# Patient Record
Sex: FEMALE | Race: WHITE | Employment: OTHER | URBAN - METROPOLITAN AREA
[De-identification: names, ages, dates, MRNs, and addresses within clinical notes are randomized per-mention and may not be internally consistent; named-entity substitution may affect disease eponyms.]

---

## 2021-09-16 PROBLEM — R13.10 DYSPHAGIA: Status: ACTIVE | Noted: 2021-09-16

## 2021-09-29 PROBLEM — E66.01 SEVERE OBESITY (HCC): Status: ACTIVE | Noted: 2021-09-29

## 2021-10-12 PROBLEM — Z98.890 POST-OPERATIVE STATE: Status: ACTIVE | Noted: 2021-10-12

## 2021-10-12 PROBLEM — K22.2 ESOPHAGEAL STRICTURE: Status: ACTIVE | Noted: 2021-10-12

## 2022-03-18 PROBLEM — Z98.890 POST-OPERATIVE STATE: Status: ACTIVE | Noted: 2021-10-12

## 2022-03-18 PROBLEM — R13.10 DYSPHAGIA: Status: ACTIVE | Noted: 2021-09-16

## 2022-03-20 PROBLEM — K22.2 ESOPHAGEAL STRICTURE: Status: ACTIVE | Noted: 2021-10-12

## 2022-03-20 PROBLEM — E66.01 SEVERE OBESITY (HCC): Status: ACTIVE | Noted: 2021-09-29

## 2022-07-08 RX ORDER — NORGESTIMATE AND ETHINYL ESTRADIOL 0.25-0.035
1 KIT ORAL DAILY
COMMUNITY
Start: 2022-04-26

## 2022-07-08 RX ORDER — TIZANIDINE 2 MG/1
2 TABLET ORAL 2 TIMES DAILY
COMMUNITY
Start: 2022-04-18

## 2022-07-08 RX ORDER — MULTIVITAMIN
1 CAPSULE ORAL DAILY
COMMUNITY

## 2022-07-08 RX ORDER — TRAMADOL HYDROCHLORIDE 50 MG/1
50 TABLET ORAL
COMMUNITY
Start: 2022-05-24

## 2022-07-08 NOTE — PERIOP NOTES
PRE-SURGICAL INSTRUCTIONS        Patient's Name:  Andre Pool      ICQMP'K Date:  7/8/2022            Covid Testing Date and Time:    Surgery Date:  7/12/2022                1. Do NOT eat or drink anything, including candy, gum, or ice chips after midnight on 07/11, unless you have specific instructions from your surgeon or anesthesia provider to do so.  2. You may brush your teeth before coming to the hospital.  3. No smoking 24 hours prior to the day of surgery. 4. No alcohol 24 hours prior to the day of surgery. 5. No recreational drugs for one week prior to the day of surgery. 6. Leave all valuables, including money/purse, at home. 7. Remove all jewelry, nail polish, acrylic nails, and makeup (including mascara); no lotions powders, deodorant, or perfume/cologne/after shave on the skin. 8. Follow instruction for Hibiclens washes and CHG wipes from surgeon's office. 9. Glasses/contact lenses and dentures may be worn to the hospital.  They will be removed prior to surgery. 10. Call your doctor if symptoms of a cold or illness develop within 24-48 hours prior to your surgery. 11.  If you are having an outpatient procedure, please make arrangements for a responsible ADULT TO [address] [place] [address] and stay with you for 24 hours after your surgery. 12. ONE VISITOR in the hospital at this time for outpatient procedures. Exceptions may be made for surgical admissions, per nursing unit guidelines      Special Instructions:      Bring COVID card. Bring list of CURRENT medications. Bring any pertinent legal medical records. Take these medications the morning of surgery with a sip of water:  NONE  Follow physician instructions about stopping anticoagulants. Complete bowel prep per MD instructions. On the day of surgery, come in the main entrance of [facility]. Let the  at the desk know you are there for surgery.   A staff member will come escort you to the surgical area on the second floor. If you have any questions or concerns, please do not hesitate to call:     (Prior to the day of surgery) PAT department:  [phone]   (Day of surgery) Pre-Op department:  [phone]    These surgical instructions were reviewed with Kayla Spear during the [facility] phone call.

## 2022-07-11 ENCOUNTER — ANESTHESIA EVENT (OUTPATIENT)
Dept: ENDOSCOPY | Age: 32
End: 2022-07-11
Payer: OTHER GOVERNMENT

## 2022-07-12 ENCOUNTER — ANESTHESIA (OUTPATIENT)
Dept: ENDOSCOPY | Age: 32
End: 2022-07-12
Payer: OTHER GOVERNMENT

## 2022-07-12 ENCOUNTER — HOSPITAL ENCOUNTER (OUTPATIENT)
Age: 32
Setting detail: OUTPATIENT SURGERY
Discharge: HOME | End: 2022-07-12
Attending: INTERNAL MEDICINE | Admitting: INTERNAL MEDICINE
Payer: OTHER GOVERNMENT

## 2022-07-12 VITALS
BODY MASS INDEX: 37.98 KG/M2 | HEIGHT: 67 IN | OXYGEN SATURATION: 100 % | RESPIRATION RATE: 16 BRPM | HEART RATE: 75 BPM | DIASTOLIC BLOOD PRESSURE: 75 MMHG | TEMPERATURE: 97.3 F | WEIGHT: 242 LBS | SYSTOLIC BLOOD PRESSURE: 112 MMHG

## 2022-07-12 PROCEDURE — 77030019988 HC FCPS ENDOSC DISP BSC -B: Performed by: INTERNAL MEDICINE

## 2022-07-12 PROCEDURE — 76060000032 HC ANESTHESIA 0.5 TO 1 HR: Performed by: INTERNAL MEDICINE

## 2022-07-12 PROCEDURE — 77030008565 HC TBNG SUC IRR ERBE -B: Performed by: INTERNAL MEDICINE

## 2022-07-12 PROCEDURE — 00813 ANES UPR LWR GI NDSC PX: CPT | Performed by: ANESTHESIOLOGY

## 2022-07-12 PROCEDURE — 74011000250 HC RX REV CODE- 250: Performed by: NURSE ANESTHETIST, CERTIFIED REGISTERED

## 2022-07-12 PROCEDURE — 2709999900 HC NON-CHARGEABLE SUPPLY: Performed by: INTERNAL MEDICINE

## 2022-07-12 PROCEDURE — 76040000007: Performed by: INTERNAL MEDICINE

## 2022-07-12 PROCEDURE — 88305 TISSUE EXAM BY PATHOLOGIST: CPT

## 2022-07-12 PROCEDURE — 00813 ANES UPR LWR GI NDSC PX: CPT | Performed by: NURSE ANESTHETIST, CERTIFIED REGISTERED

## 2022-07-12 PROCEDURE — 74011250636 HC RX REV CODE- 250/636: Performed by: NURSE ANESTHETIST, CERTIFIED REGISTERED

## 2022-07-12 RX ORDER — ATROPINE SULFATE 0.1 MG/ML
0.5 INJECTION INTRAVENOUS
Status: CANCELLED | OUTPATIENT
Start: 2022-07-12 | End: 2022-07-13

## 2022-07-12 RX ORDER — NALOXONE HYDROCHLORIDE 0.4 MG/ML
0.4 INJECTION, SOLUTION INTRAMUSCULAR; INTRAVENOUS; SUBCUTANEOUS
Status: CANCELLED | OUTPATIENT
Start: 2022-07-12 | End: 2022-07-12

## 2022-07-12 RX ORDER — EPINEPHRINE 0.1 MG/ML
1 INJECTION INTRACARDIAC; INTRAVENOUS
Status: CANCELLED | OUTPATIENT
Start: 2022-07-12 | End: 2022-07-13

## 2022-07-12 RX ORDER — SODIUM CHLORIDE 0.9 % (FLUSH) 0.9 %
5-40 SYRINGE (ML) INJECTION AS NEEDED
Status: CANCELLED | OUTPATIENT
Start: 2022-07-12

## 2022-07-12 RX ORDER — SODIUM CHLORIDE 0.9 % (FLUSH) 0.9 %
5-40 SYRINGE (ML) INJECTION AS NEEDED
Status: DISCONTINUED | OUTPATIENT
Start: 2022-07-12 | End: 2022-07-12 | Stop reason: HOSPADM

## 2022-07-12 RX ORDER — MAGNESIUM SULFATE 100 %
4 CRYSTALS MISCELLANEOUS AS NEEDED
Status: DISCONTINUED | OUTPATIENT
Start: 2022-07-12 | End: 2022-07-12 | Stop reason: HOSPADM

## 2022-07-12 RX ORDER — PSEUDOEPHEDRINE HCL 30 MG
500 TABLET ORAL DAILY
COMMUNITY

## 2022-07-12 RX ORDER — PROPOFOL 10 MG/ML
INJECTION, EMULSION INTRAVENOUS AS NEEDED
Status: DISCONTINUED | OUTPATIENT
Start: 2022-07-12 | End: 2022-07-12 | Stop reason: HOSPADM

## 2022-07-12 RX ORDER — SODIUM CHLORIDE 0.9 % (FLUSH) 0.9 %
5-40 SYRINGE (ML) INJECTION EVERY 8 HOURS
Status: CANCELLED | OUTPATIENT
Start: 2022-07-12

## 2022-07-12 RX ORDER — LIDOCAINE HYDROCHLORIDE 20 MG/ML
INJECTION, SOLUTION EPIDURAL; INFILTRATION; INTRACAUDAL; PERINEURAL AS NEEDED
Status: DISCONTINUED | OUTPATIENT
Start: 2022-07-12 | End: 2022-07-12 | Stop reason: HOSPADM

## 2022-07-12 RX ORDER — INSULIN LISPRO 100 [IU]/ML
INJECTION, SOLUTION INTRAVENOUS; SUBCUTANEOUS ONCE
Status: DISCONTINUED | OUTPATIENT
Start: 2022-07-12 | End: 2022-07-12 | Stop reason: HOSPADM

## 2022-07-12 RX ORDER — DEXTROMETHORPHAN/PSEUDOEPHED 2.5-7.5/.8
1.2 DROPS ORAL
Status: CANCELLED | OUTPATIENT
Start: 2022-07-12

## 2022-07-12 RX ORDER — SODIUM CHLORIDE, SODIUM LACTATE, POTASSIUM CHLORIDE, CALCIUM CHLORIDE 600; 310; 30; 20 MG/100ML; MG/100ML; MG/100ML; MG/100ML
75 INJECTION, SOLUTION INTRAVENOUS CONTINUOUS
Status: DISCONTINUED | OUTPATIENT
Start: 2022-07-12 | End: 2022-07-12 | Stop reason: HOSPADM

## 2022-07-12 RX ORDER — FLUMAZENIL 0.1 MG/ML
0.2 INJECTION INTRAVENOUS
Status: CANCELLED | OUTPATIENT
Start: 2022-07-12 | End: 2022-07-12

## 2022-07-12 RX ORDER — SODIUM CHLORIDE 0.9 % (FLUSH) 0.9 %
5-40 SYRINGE (ML) INJECTION EVERY 8 HOURS
Status: DISCONTINUED | OUTPATIENT
Start: 2022-07-12 | End: 2022-07-12 | Stop reason: HOSPADM

## 2022-07-12 RX ADMIN — PROPOFOL 40 MG: 10 INJECTION, EMULSION INTRAVENOUS at 07:49

## 2022-07-12 RX ADMIN — PROPOFOL 100 MG: 10 INJECTION, EMULSION INTRAVENOUS at 07:32

## 2022-07-12 RX ADMIN — PROPOFOL 40 MG: 10 INJECTION, EMULSION INTRAVENOUS at 07:43

## 2022-07-12 RX ADMIN — SODIUM CHLORIDE, PRESERVATIVE FREE 20 MG: 5 INJECTION INTRAVENOUS at 06:57

## 2022-07-12 RX ADMIN — PROPOFOL 50 MG: 10 INJECTION, EMULSION INTRAVENOUS at 07:40

## 2022-07-12 RX ADMIN — PROPOFOL 40 MG: 10 INJECTION, EMULSION INTRAVENOUS at 07:46

## 2022-07-12 RX ADMIN — PROPOFOL 100 MG: 10 INJECTION, EMULSION INTRAVENOUS at 07:34

## 2022-07-12 RX ADMIN — PROPOFOL 25 MG: 10 INJECTION, EMULSION INTRAVENOUS at 07:52

## 2022-07-12 RX ADMIN — SODIUM CHLORIDE, POTASSIUM CHLORIDE, SODIUM LACTATE AND CALCIUM CHLORIDE 75 ML/HR: 600; 310; 30; 20 INJECTION, SOLUTION INTRAVENOUS at 06:57

## 2022-07-12 RX ADMIN — LIDOCAINE HYDROCHLORIDE 80 MG: 20 INJECTION, SOLUTION EPIDURAL; INFILTRATION; INTRACAUDAL; PERINEURAL at 07:32

## 2022-07-12 RX ADMIN — PROPOFOL 50 MG: 10 INJECTION, EMULSION INTRAVENOUS at 07:37

## 2022-07-12 NOTE — PROGRESS NOTES
conducted a pre-surgery visit with Khloe Chung, who is a 27 y.o.,female. The  provided the following Interventions:  Initiated a relationship of care and support. Offered prayer and assurance of continued prayers on patient's behalf. There is no advance directive present. Plan:  Chaplains will continue to follow and will provide pastoral care on an as needed/requested basis.  recommends bedside caregivers page  on duty if patient shows signs of acute spiritual or emotional distress.   Reiseñor 3   Board Certified [address] [place] [address]   [phone]

## 2022-07-12 NOTE — DISCHARGE INSTRUCTIONS
Patient Education        Upper GI Endoscopy: What to Expect at [address] had an upper GI endoscopy. Your doctor used a thin, lighted tube that bends to look at the inside of your esophagus, your stomach, and the first part of the small intestine, called the duodenum. After you have an endoscopy, you will stay at the hospital or clinic for 1 to 2 hours. This will allow the medicine to wear off. You will be able to go home after your doctor or nurse checks to make sure that you're not having any problems. You may have to stay overnight if you had treatment during the test. You may have a sore throat for a day or two after the test.  This care sheet gives you a general idea about what to expect after the test.  How can you care for yourself at home? Activity   · Rest as much as you need to after you go home. · You should be able to go back to your usual activities the day after the test.  Diet   · Follow your doctor's directions for eating after the test.  · Drink plenty of fluids (unless your doctor has told you not to). Medications   · If you have a sore throat the day after the test, use an over-the-counter spray to numb your throat. Follow-up care is a key part of your treatment and safety. Be sure to make and go to all appointments, and call your doctor if you are having problems. It's also a good idea to know your test results and keep a list of the medicines you take. When should you call for help? Call 911 anytime you think you may need emergency care. For example, call if:    · You passed out (lost consciousness).     · You have trouble breathing.     · You pass maroon or bloody stools.    Call your doctor now or seek immediate medical care if:    · You have pain that does not get better after your take pain medicine.     · You have new or worse belly pain.     · You have blood in your stools.     · You are sick to your stomach and cannot keep fluids down.     · You have a fever.     · You cannot pass stools or gas. Watch closely for changes in your health, and be sure to contact your doctor if:    · Your throat still hurts after a day or two.     · You do not get better as expected. Where can you learn more? Go to http://www.Lift.com/  Enter J454 in the search box to learn more about \"Upper GI Endoscopy: What to Expect at Home. \"  Current as of: September 8, 2021               Content Version: 13.2  © 2006-2022 [employer]. Care instructions adapted under license by [employer] (which disclaims liability or warranty for this information). If you have questions about a medical condition or this instruction, always ask your healthcare professional. [address] any warranty or liability for your use of this information. Colonoscopy: What to Expect at [address] [place] [address]  After a colonoscopy, you'll stay at the clinic until you wake up. Then you can go home. But you'll need to arrange for a ride. Your doctor will tell you when you can eat and do your other usual activities. Your doctor will talk to you about when you'll need your next colonoscopy. Your doctor can help you decide how often you need to be checked. This will depend on the results of your test and your risk for colorectal cancer. After the test, you may be bloated or have gas pains. You may need to pass gas. If a biopsy was done or a polyp was removed, you may have streaks of blood in your stool (feces) for a few days. Problems such as heavy rectal bleeding may not occur until several weeks after the test. This isn't common. But it can happen after polyps are removed. This care sheet gives you a general idea about how long it will take for you to recover. But each person recovers at a different pace. Follow the steps below to get better as quickly as possible. How can you care for yourself at home? Activity    · Rest when you feel tired.      · You can do your normal activities when it feels okay to do so. Diet    · Follow your doctor's directions for eating. · Unless your doctor has told you not to, drink plenty of fluids. This helps to replace the fluids that were lost during the colon prep. · Do not drink alcohol. Medicines    · Your doctor will tell you if and when you can restart your medicines. You will also be given instructions about taking any new medicines. · If you take aspirin or some other blood thinner, ask your doctor if and when to start taking it again. Make sure that you understand exactly what your doctor wants you to do. · If polyps were removed or a biopsy was done during the test, your doctor may tell you not to take aspirin or other anti-inflammatory medicines for a few days. These include ibuprofen (Advil, Motrin) and naproxen (Aleve). Other instructions    · For your safety, do not drive or operate machinery until the medicine wears off and you can think clearly. Your doctor may tell you not to drive or operate machinery until the day after your test.     · Do not sign legal documents or make major decisions until the medicine wears off and you can think clearly. The anesthesia can make it hard for you to fully understand what you are agreeing to. Follow-up care is a key part of your treatment and safety. Be sure to make and go to all appointments, and call your doctor if you are having problems. It's also a good idea to know your test results and keep a list of the medicines you take. When should you call for help? Call 911 anytime you think you may need emergency care. For example, call if:    · You passed out (lost consciousness). · You pass maroon or bloody stools. · You have trouble breathing. Call your doctor now or seek immediate medical care if:    · You have pain that does not get better after you take pain medicine. · You are sick to your stomach or cannot drink fluids.      · You have new or worse belly pain. · You have blood in your stools. · You have a fever. · You cannot pass stools or gas. Watch closely for changes in your health, and be sure to contact your doctor if you have any problems. Where can you learn more? Go to http://www.gray.com/  Enter E264 in the search box to learn more about \"Colonoscopy: What to Expect at Home. \"  Current as of: September 8, 2021               Content Version: 13.2  © 2006-2022 "[employer]. Care instructions adapted under license by [employer] (which disclaims liability or warranty for this information). If you have questions about a medical condition or this instruction, always ask your healthcare professional. [place] [address] any warranty or liability for your use of this information. DISCHARGE SUMMARY from Nurse     POST-PROCEDURE INSTRUCTIONS:    Call your Physician if you:  ? Observe any excess bleeding. ? Develop a temperature over 100.5o F.  ? Experience abdominal, shoulder or chest pain. ? Notice any signs of decreased circulation or nerve impairment to an extremity such as a change in color, persistent numbness, tingling, coldness or increase in pain. ? Vomit blood or you have nausea and vomiting lasting longer than 4 hours. ? Are unable to take medications. ? Are unable to urinate within 8 hours after discharge following general anesthesia or intravenous sedation. For the next 24 hours after receiving general anesthesia or intravenous sedation, or while taking prescription Narcotics, limit your activities:  ? Do NOT drive a motor vehicle, operate hazard machinery or power tools, or perform tasks that require coordination. The medication you received during your procedure may have some effect on your mental awareness. ? Do NOT make important personal or business decisions.   The medication you received during your procedure may have some effect on your mental awareness. ? Do NOT drink alcoholic beverages. These drinks do not mix well with the medications that have been given to you. ? Upon discharge from the hospital, you must be accompanied by a responsible adult. ? Resume your diet as directed by your physician. ? Resume medications as your physician has prescribed. ? Please give a list of your current medications to your Primary Care Provider. ? Please update this list whenever your medications are discontinued, doses are changed, or new medications (including over-the-counter products) are added. ? Please carry medication information at all times in case of emergency situations. These are general instructions for a healthy lifestyle:    No smoking/ No tobacco products/ Avoid exposure to second hand smoke.  Surgeon General's Warning:  Quitting smoking now greatly reduces serious risk to your health. Obesity, smoking, and a sedentary lifestyle greatly increase your risk for illness.  A healthy diet, regular physical exercise & weight monitoring are important for maintaining a healthy lifestyle   You may be retaining fluid if you have a history of heart failure or if you experience any of the following symptoms:  Weight gain of 3 pounds or more overnight or 5 pounds in a week, increased swelling in our hands or feet or shortness of breath while lying flat in bed. Please call your doctor as soon as you notice any of these symptoms; do not wait until your next office visit. Recognize signs and symptoms of STROKE:  F  -  Face looks uneven  A  -  Arms unable to move or move unevenly  S  -  Speech slurred or non-existent  T  -  Time to call 911 - as soon as signs and symptoms begin - DO NOT go back to bed or wait to see If you get better - TIME IS BRAIN. Colorectal Screening   Colorectal cancer almost always develops from precancerous polyps (abnormal growths) in the colon or rectum.   Screening tests can find precancerous polyps, so that they can be removed before they turn into cancer. Screening tests can also find colorectal cancer early, when treatment works best.  [place] Speak with your physician about when you should begin screening and how often you should be tested. [employer] Activation    Thank you for requesting access to [employer]. Please follow the instructions below to securely access and download your online medical record. Publisha allows you to send messages to your doctor, view your test results, renew your prescriptions, schedule appointments, and more. How Do I Sign Up? 1. In your internet browser, go to [email or web address]. 2. Click on the First Time User? Click Here link in the Sign In box. You will see the New Member Sign Up page. 3. Enter your Publisha Access Code exactly as it appears below. You will not need to use this code after youve completed the sign-up process. If you do not sign up before the expiration date, you must request a new code. Publisha Access Code: Activation code not generated  Current Publisha Status: Active (This is the date your Publisha access code will )    4. Enter the last four digits of your Social Security Number (xxxx) and Date of Birth (mm/dd/yyyy) as indicated and click Submit. You will be taken to the next sign-up page. 5. Create a Publisha ID. This will be your Publisha login ID and cannot be changed, so think of one that is secure and easy to remember. 6. Create a Publisha password. You can change your password at any time. 7. Enter your Password Reset Question and Answer. This can be used at a later time if you forget your password. 8. Enter your e-mail address. You will receive e-mail notification when new information is available in [address] 9. Click Sign Up. You can now view and download portions of your medical record. 10. Click the Download Summary menu link to download a portable copy of your medical information.     Additional Information    If you have questions, please call [phone]. Remember, MyChart is NOT to be used for urgent needs. For medical emergencies, dial 911. Educational references and/or instructions provided during this visit included:    See Attached      APPOINTMENTS:    Per MD Instruction    Discharge information has been reviewed with the patient. The patient verbalized understanding. Patient Education        Esophageal Dilation: What to Expect at [address] had an esophageal dilation. This procedure can open up narrow areas of the esophagus. After the procedure, you will stay at the hospital or surgery center for 1 to 2 hours. This will allow the medicine to wear off. You will be able to go home after your doctor or nurse checks to make sure that you're not having any problems. This care sheet gives you a general idea about how long it will take for you to recover. But each person recovers at a different pace. Follow the steps below to get better as quickly as possible. How can you care for yourself at home? Activity    · Rest as much as you need to after you go home.     · You should be able to go back to your usual activities the day after the procedure. Diet    · Follow your doctor's directions for eating after the procedure.     · Drink plenty of fluids (unless your doctor has told you not to). Medicines    · Your doctor will tell you if and when you can restart your medicines. He or she will also give you instructions about taking any new medicines.     · If you take aspirin or some other blood thinner, ask your doctor if and when to start taking it again. Make sure that you understand exactly what your doctor wants you to do.     · If you have a sore throat the day after the procedure, use an over-the-counter spray to numb your throat. Sucking on throat lozenges and gargling with warm salt water may also help relieve your symptoms.    Follow-up care is a key part of your treatment and safety. Be sure to make and go to all appointments, and call your doctor if you are having problems. It's also a good idea to know your test results and keep a list of the medicines you take. When should you call for help? Call 911 anytime you think you may need emergency care. For example, call if:    · You passed out (lost consciousness).     · You have trouble breathing.     · Your stools are maroon or very bloody   Call your doctor now or seek immediate medical care if:    · You have new or worse belly pain.     · You have a fever.     · You have new or more blood in your stools.     · You are sick to your stomach and cannot drink fluids.     · You cannot pass stools or gas.     · You have pain that does not get better after you take pain medicine. Watch closely for changes in your health, and be sure to contact your doctor if:    · Your throat still hurts after a day or two.     · You do not get better as expected. Where can you learn more? Go to http://www.gray.com/  Enter J014 in the search box to learn more about \"Esophageal Dilation: What to Expect at Home. \"  Current as of: September 8, 2021               Content Version: 13.2  © 2006-2022 [employer]. Care instructions adapted under license by [employer] (which disclaims liability or warranty for this information). If you have questions about a medical condition or this instruction, always ask your healthcare professional. [place] [address] any warranty or liability for your use of this information.

## 2022-07-12 NOTE — PROCEDURES
[email or web address]  [place], [address]      Brief Procedure Note    Malgorzata Davies  1990  [id number]    Date of Procedure: 2022    Preoperative diagnosis: Dysphagia:  787.20 - R13.10  Obesity, BMI over 30:  278.02 - E66.3  Bariatric surgery status:  V45.86 - Z98.84  Personal history of colon polyp:  V12.72 - Z86.010  Rectal bleedin.3 - K62.5  Colostomy status:  Z93.3    Postoperative diagnosis: ENDO: esophageal bxs, 48fr dilation  COLO: Normal     Type of Anesthesia: MAC (Monitored anesthesia care)    Description of findings: same as post op dx    Procedure: Procedure(s):  UPPER ENDOSCOPY with bxs and dilation  COLONOSCOPY    :  Dr. Julieta Solares MD    Assistant(s): Endoscopy Technician-1: Pedro Body  Float Staff: Hira Rogel RN    EBL:None    Specimens:   ID Type Source Tests Collected by Time Destination   1 : esophageal bxs Preservative Esophagus  Buffy Kraft MD 2022 [address]       Findings: See printed and scanned procedure note    Complications: None    Dr. Julieta Solares MD  2022  8:11 AM

## 2022-07-12 NOTE — H&P
[email or web address]  [phone]    GASTROENTEROLOGY Pre-Procedure H and P      Impression/Plan:   1. This patient is consented for an EGD and colonoscopy via colostomy and rectal stump for rectal bleeding, h/o polyps and dysphagia. Chief Complaint: rectal bleeding, h/o polyps and dysphagia. HPI:  Analy Garcia is a 27 y.o. female who presents for an EGD and colonoscopy for evaluation of rectal bleeding, h/o polyps and dysphagia.       PMH:   Past Medical History:   Diagnosis Date    Anxiety     Diverticulitis 2019    HAS COLOSTOMY    Dysphagia     GERD (gastroesophageal reflux disease)     Liver disease     FATTY LIVER ([facility])    Migraine     Morbid obesity (HCC)     Panic anxiety syndrome     PCOS (polycystic ovarian syndrome)     Prediabetes     RUQ pain     Thyroid cancer ([address]  , 2018    Uterine cancer ([facility] [address]        PSH:   Past Surgical History:   Procedure Laterality Date    HX  SECTION      HX GASTRIC BYPASS  2021    HX GI  2019    Colostomy    HX HEENT  2014    Radical neck dissection, left thyroidectomy    HX HEENT  2018    radical neck right thyroid    HX HYSTERECTOMY  2017    HX OTHER SURGICAL  2021    EGD- ESOPHAGEAL DLIATION AND BX    HX THYROIDECTOMY      [place] ABDOMEN SURGERY PROC UNLISTED  2019    SIGMOID COLECTOMY with colostomy       Social HX:   Social History     Socioeconomic History    Marital status:      Spouse name: Not on file    Number of children: Not on file    Years of education: Not on file    Highest education level: Not on file   Occupational History    Not on file   Tobacco Use    Smoking status: Former Smoker     Packs/day: 1.00     Years: 15.00     Pack years: 15.00     Quit date: 2019     Years since quitting: 3.0    Smokeless tobacco: Never Used   Vaping Use    Vaping Use: Never used   Substance and Sexual Activity    Alcohol use: Not Currently    Drug use: Never    Sexual activity: Not on file   Other Topics Concern    Not on file   Social History Narrative    Not on file     Social Determinants of Health     Financial Resource Strain:     Difficulty of Paying Living Expenses: Not on file   Food Insecurity:     Worried About Running Out of Food in the Last Year: Not on file    Onel of Food in the Last Year: Not on file   Transportation Needs:     Lack of Transportation (Medical): Not on file    Lack of Transportation (Non-Medical): Not on file   Physical Activity:     Days of Exercise per Week: Not on file    Minutes of Exercise per Session: Not on file   Stress:     Feeling of Stress : Not on file   Social Connections:     Frequency of Communication with Friends and Family: Not on file    Frequency of Social Gatherings with Friends and Family: Not on file    Attends Nondenominational Services: Not on file    Active Member of [address] [place] [address] or Organizations: Not on file    Attends Club or Organization Meetings: Not on file    Marital Status: Not on file   Intimate Partner Violence:     Fear of Current or Ex-Partner: Not on file    Emotionally Abused: Not on file    Physically Abused: Not on file    Sexually Abused: Not on file   Housing Stability:     Unable to Pay for Housing in the Last Year: Not on file    Number of [place] in the Last Year: Not on file    Unstable Housing in the Last Year: Not on file       FHX:   Family History   Problem Relation Age of Onset    Cancer Mother         lymphoma, cervical ca    Crohn's Disease Mother     Liver Disease Father        Allergy:   No Known Allergies    Home Medications:     Medications Prior to Admission   Medication Sig    calcium citrate 250 mg calcium tab tablet Take 500 mg by mouth daily.  tiZANidine (ZANAFLEX) 2 mg tablet Take 2 mg by mouth two (2) times a day.  traMADoL (ULTRAM) 50 mg tablet Take 50 mg by mouth three (3) times daily as needed. Usually takes BID    Nymyo 0.25-35 mg-mcg tab Take 1 Tablet by mouth daily.     multivitamin capsule Take 1 Capsule by mouth daily.  sucralfate (Carafate) 100 mg/mL suspension Take 10 mL by mouth four (4) times daily. (Patient taking differently: Take 1 g by mouth four (4) times daily as needed.)    ondansetron (ZOFRAN ODT) 8 mg disintegrating tablet Take 1 Tablet by mouth every eight (8) hours as needed for Nausea or Vomiting. Indications: prevent nausea and vomiting after surgery    acetaminophen (TYLENOL) 160 mg/5 mL suspension Take 20.3 mL by mouth every six (6) hours as needed for Pain.  omeprazole (PRILOSEC) 40 mg capsule Take 40 mg by mouth daily.  amitriptyline (ELAVIL) 25 mg tablet Take 50 mg by mouth nightly. Indications: TAKES FOR PAIN    pregabalin (LYRICA) 75 mg capsule Take 75 mg by mouth two (2) times a day.  cyanocobalamin (VITAMIN B12) 1,000 mcg/mL injection every month.  levothyroxine (SYNTHROID) 88 mcg tablet Take 88 mcg by mouth daily. Total dose of 188mcg    levothyroxine (SYNTHROID) 100 mcg tablet Take 100 mcg by mouth daily. Total dose of 188mcg       Review of Systems:     A complete 10 point review of systems was performed and pertinents are as per the HPI. Remainder of the review of systems was negative. Visit Vitals  /71 (BP 1 Location: Right upper arm, BP Patient Position: At rest)   Pulse 75   Temp 98 °F (36.7 °C)   Resp 16   Ht 5' 7\" (1.702 m)   Wt 109.8 kg (242 lb)   SpO2 100%   Breastfeeding No   BMI 37.90 kg/m²       Physical Assessment:     General: alert, cooperative, no acute distress, appears stated age. HEENT: normocephalic, no scleral icterus, moist mucous membranes, EOMs intact, no neck masses noted. Respiratory: lungs clear to auscultation bilaterally. Cardiovascular: regular rate and rhythm, no murmurs, rubs or gallops. Abdomen: normal bowel sounds, soft, non-tender to palpation. Extremities: no lower extremity edema, no cyanosis or clubbing. Neuro: alert and oriented x 3; non-focal exam.  Skin: no rashes.   Psych: normal mood and affect. Arun Rosario MD  [facility]  [email or web address]  Phone: [phone]  Pager: [phone]  [email or web address]

## 2022-07-12 NOTE — ANESTHESIA POSTPROCEDURE EVALUATION
Procedure(s):  UPPER ENDOSCOPY with bxs and dilation  COLONOSCOPY. MAC    Anesthesia Post Evaluation      Multimodal analgesia: multimodal analgesia used between 6 hours prior to anesthesia start to PACU discharge  Patient location during evaluation: bedside  Patient participation: complete - patient participated  Level of consciousness: awake  Pain management: adequate  Airway patency: patent  Anesthetic complications: no  Cardiovascular status: stable  Respiratory status: acceptable  Hydration status: acceptable  Post anesthesia nausea and vomiting:  controlled  Final Post Anesthesia Temperature Assessment:  Normothermia (36.0-37.5 degrees C)      INITIAL Post-op Vital signs:   Vitals Value Taken Time   /72 07/12/22 0804   Temp 36.9 °C (98.5 °F) 07/12/22 0804   Pulse 76 07/12/22 0809   Resp 17 07/12/22 0809   SpO2 100 % 07/12/22 0809   Vitals shown include unvalidated device data.

## 2022-07-12 NOTE — ANESTHESIA PREPROCEDURE EVALUATION
Relevant Problems   ENDOCRINE   (+) Severe obesity (HCC)       Anesthetic History   No history of anesthetic complications       Comments: Anesthesia consult note done 9/23/2021  Stephany Cassette      PCP: Noelle Osler MD    COVID Vaccine: Has had both vaccines    Most recent surgery EGD on 8/16/2021     Review of Systems / Medical History  Patient summary reviewed, nursing notes reviewed and pertinent labs reviewed    Pulmonary          Smoker ( smoked 1 PPD x 15 years - quit 2019 - ETOH none)         Neuro/Psych         Psychiatric history (   Elavil 25 mg qd - takes for pain and skin rash)     Cardiovascular  Within defined limits                Exercise tolerance: >4 METS: 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  Comments: EKG 6/18/2021:  Normal sinus rhythm   Normal ECG   No previous ECGs available    GI/Hepatic/Renal     GERD ( Prilosec qd): well controlled      Liver disease ( Hepatic steatosis - liver bx this surgery )    Comments: Hx of diverticulitis and polycystic ovarian syndrome Endo/Other    Diabetes ( Pre-diabetes)  Hypothyroidism ( Synthroid 188 mcg qd ): well controlled  Morbid obesity ( BMI 55.4) and cancer ( Uterine cancer - hysterectomy)    Comments: Hashimoto's Thyroiditis    6/18/2021; Hemoglobin A1c - 5.9 %   Other Findings   Comments: Chest x-ray 6/18/2021:  IMPRESSION: No acute cardiopulmonary process    N/V and abd pain 2-3 weeks after gastric bypass;   Likely GE junction stricture    For botox today         Relevant Problems   ENDOCRINE   (+) Severe obesity (HCC)       Anesthetic History   No history of anesthetic complications            Review of Systems / Medical History    Relevant Problems   ENDOCRINE   (+) Severe obesity (HCC)       Anesthetic History   No history of anesthetic complications            Review of Systems / Medical History  Patient summary reviewed and pertinent labs reviewed    Pulmonary  Within defined limits                 Neuro/Psych   Within defined limits           Cardiovascular                  Exercise tolerance: >4 METS     GI/Hepatic/Renal     GERD: well controlled      Liver disease    Comments: Elevated LFTs Endo/Other    Diabetes: well controlled  Hypothyroidism: well controlled  Morbid obesity    Comments: Pre-diabetic Other Findings              Physical Exam    Airway  Mallampati: II  TM Distance: 4 - 6 cm  Neck ROM: normal range of motion   Mouth opening: Normal     Cardiovascular  Regular rate and rhythm,  S1 and S2 normal,  no murmur, click, rub, or gallop             Dental    Dentition: Full upper dentures     Pulmonary  Breath sounds clear to auscultation               Abdominal  GI exam deferred       Other Findings            Anesthetic Plan    ASA: 3  Anesthesia type: general          Induction: Intravenous  Anesthetic plan and risks discussed with: Patient          Patient summary reviewed and pertinent labs reviewed    Pulmonary  Within defined limits                 Neuro/Psych   Within defined limits           Cardiovascular                  Exercise tolerance: >4 METS     GI/Hepatic/Renal     GERD: well controlled      Liver disease    Comments: Elevated LFTs Endo/Other    Diabetes: well controlled  Hypothyroidism: well controlled  Morbid obesity    Comments: Pre-diabetic Other Findings              Physical Exam    Airway  Mallampati: II  TM Distance: 4 - 6 cm  Neck ROM: normal range of motion   Mouth opening: Normal     Cardiovascular  Regular rate and rhythm,  S1 and S2 normal,  no murmur, click, rub, or gallop             Dental    Dentition: Full upper dentures     Pulmonary  Breath sounds clear to auscultation               Abdominal  GI exam deferred       Other Findings            Anesthetic Plan    ASA: 3  Anesthesia type: general          Induction: Intravenous  Anesthetic plan and risks discussed with: Patient              Physical Exam    Airway  Mallampati: II  TM Distance: 4 - 6 cm  Neck ROM: normal range of motion Mouth opening: Normal     Cardiovascular    Rhythm: regular  Rate: normal         Dental    Dentition: Full upper dentures  Comments: Lower teeth few and very poor dentition   Pulmonary  Breath sounds clear to auscultation               Abdominal  GI exam deferred      Comments: Colostomy bag present Other Findings            Anesthetic Plan    ASA: 3  Anesthesia type: MAC            Anesthetic plan and risks discussed with: Patient

## 2023-02-01 RX ORDER — OMEPRAZOLE 40 MG/1
40 CAPSULE, DELAYED RELEASE ORAL DAILY
COMMUNITY
Start: 2021-06-21

## 2023-02-01 RX ORDER — TIZANIDINE 2 MG/1
2 TABLET ORAL 2 TIMES DAILY
COMMUNITY
Start: 2022-04-18

## 2023-02-01 RX ORDER — AMITRIPTYLINE HYDROCHLORIDE 25 MG/1
50 TABLET, FILM COATED ORAL
COMMUNITY
Start: 2021-08-23

## 2023-02-01 RX ORDER — CYANOCOBALAMIN 1000 UG/ML
INJECTION, SOLUTION INTRAMUSCULAR; SUBCUTANEOUS
COMMUNITY

## 2023-02-01 RX ORDER — PREGABALIN 75 MG/1
75 CAPSULE ORAL 2 TIMES DAILY
COMMUNITY
Start: 2021-07-17

## 2023-02-01 RX ORDER — ACETAMINOPHEN 160 MG/5ML
650 SUSPENSION, ORAL (FINAL DOSE FORM) ORAL EVERY 6 HOURS PRN
COMMUNITY
Start: 2021-09-30

## 2023-02-01 RX ORDER — NORGESTIMATE AND ETHINYL ESTRADIOL 0.25-0.035
1 KIT ORAL DAILY
COMMUNITY
Start: 2022-04-26

## 2023-02-01 RX ORDER — TRAMADOL HYDROCHLORIDE 50 MG/1
50 TABLET ORAL 3 TIMES DAILY PRN
COMMUNITY
Start: 2022-05-24

## 2023-02-01 RX ORDER — SUCRALFATE ORAL 1 G/10ML
1 SUSPENSION ORAL 4 TIMES DAILY
COMMUNITY
Start: 2022-06-17

## 2023-02-01 RX ORDER — ONDANSETRON 8 MG/1
8 TABLET, ORALLY DISINTEGRATING ORAL EVERY 8 HOURS PRN
COMMUNITY
Start: 2021-09-30

## 2023-02-01 RX ORDER — CALCIUM CITRATE 1040MG
500 TABLET ORAL DAILY
COMMUNITY

## 2023-02-01 RX ORDER — LEVOTHYROXINE SODIUM 0.1 MG/1
100 TABLET ORAL DAILY
COMMUNITY
Start: 2021-05-17

## 2023-02-01 RX ORDER — LEVOTHYROXINE SODIUM 88 UG/1
88 TABLET ORAL DAILY
COMMUNITY
Start: 2021-05-17

## (undated) DEVICE — SOLUTION IRRIG 1000ML H2O STRL BLT

## (undated) DEVICE — SYR 50ML SLIP TIP NSAF LF STRL --

## (undated) DEVICE — SYR 20ML LL STRL LF --

## (undated) DEVICE — GOWN ISOL IMPERV UNIV, DISP, OPEN BACK, BLUE --

## (undated) DEVICE — FLUFF AND POLYMER UNDERPAD,EXTRA HEAVY: Brand: WINGS

## (undated) DEVICE — SYRINGE MED 25GA 3ML L5/8IN SUBQ PLAS W/ DETACH NDL SFTY

## (undated) DEVICE — YANKAUER,SMOOTH HANDLE,HIGH CAPACITY: Brand: MEDLINE INDUSTRIES, INC.

## (undated) DEVICE — CATHETER SUCT TR FL TIP 14FR W/ O CTRL

## (undated) DEVICE — CANNULA NSL AD TBNG L14FT STD PVC O2 CRV CONN NONFLARED NSL

## (undated) DEVICE — ENDOSCOPY PUMP TUBING/ CAP SET: Brand: ERBE

## (undated) DEVICE — GAUZE,SPONGE,4"X4",16PLY,STRL,LF,10/TRAY: Brand: MEDLINE

## (undated) DEVICE — STERILE POLYISOPRENE POWDER-FREE SURGICAL GLOVES: Brand: PROTEXIS

## (undated) DEVICE — MEDI-VAC NON-CONDUCTIVE SUCTION TUBING: Brand: CARDINAL HEALTH

## (undated) DEVICE — FCPS RAD JAW 4LC 240CM W/NDL -- BX/20 RADIAL JAW 4

## (undated) DEVICE — LINER SUCT CANSTR 3000CC PLAS SFT PRE ASSEMB W/OUT TBNG W/

## (undated) DEVICE — SYR 10ML LUER LOK 1/5ML GRAD --

## (undated) DEVICE — BITE BLOCK ENDOSCP UNIV AD 6 TO 9.4 MM

## (undated) DEVICE — CANNULA ORIG TL CLR W FOAM CUSHIONS AND 14FT SUPL TB 3 CHN

## (undated) DEVICE — BASIN EMSIS 16OZ GRAPHITE PLAS KID SHP MOLD GRAD FOR ORAL